# Patient Record
Sex: FEMALE | Race: WHITE
[De-identification: names, ages, dates, MRNs, and addresses within clinical notes are randomized per-mention and may not be internally consistent; named-entity substitution may affect disease eponyms.]

---

## 2018-04-02 ENCOUNTER — HOSPITAL ENCOUNTER (EMERGENCY)
Dept: HOSPITAL 1 - ED | Age: 53
Discharge: HOME | End: 2018-04-02
Payer: COMMERCIAL

## 2018-04-02 VITALS — SYSTOLIC BLOOD PRESSURE: 126 MMHG | DIASTOLIC BLOOD PRESSURE: 87 MMHG

## 2018-04-02 VITALS
WEIGHT: 145.99 LBS | HEIGHT: 65 IN | HEIGHT: 65 IN | WEIGHT: 145.99 LBS | BODY MASS INDEX: 24.32 KG/M2 | BODY MASS INDEX: 24.32 KG/M2

## 2018-04-02 DIAGNOSIS — Z00.00: Primary | ICD-10-CM

## 2019-02-06 ENCOUNTER — HOSPITAL ENCOUNTER (INPATIENT)
Dept: HOSPITAL 1 - ED | Age: 54
LOS: 2 days | Discharge: OTHER INSTITUTION | DRG: 202 | End: 2019-02-08
Attending: INTERNAL MEDICINE | Admitting: INTERNAL MEDICINE
Payer: COMMERCIAL

## 2019-02-06 VITALS
BODY MASS INDEX: 29.41 KG/M2 | HEIGHT: 65 IN | WEIGHT: 176.5 LBS | WEIGHT: 176.5 LBS | BODY MASS INDEX: 29.41 KG/M2 | HEIGHT: 65 IN

## 2019-02-06 DIAGNOSIS — R09.1: ICD-10-CM

## 2019-02-06 DIAGNOSIS — J44.1: ICD-10-CM

## 2019-02-06 DIAGNOSIS — E23.2: ICD-10-CM

## 2019-02-06 DIAGNOSIS — I49.3: ICD-10-CM

## 2019-02-06 DIAGNOSIS — Z87.891: ICD-10-CM

## 2019-02-06 DIAGNOSIS — Z79.52: ICD-10-CM

## 2019-02-06 DIAGNOSIS — I25.10: ICD-10-CM

## 2019-02-06 DIAGNOSIS — I11.9: ICD-10-CM

## 2019-02-06 DIAGNOSIS — J20.9: Primary | ICD-10-CM

## 2019-02-06 DIAGNOSIS — Z79.84: ICD-10-CM

## 2019-02-06 LAB
ALBUMIN SERPL-MCNC: 3.6 G/DL (ref 3.4–5)
ALP SERPL-CCNC: 79 U/L (ref 46–116)
ALT SERPL-CCNC: 21 U/L (ref 14–59)
AMPHETAMINES UR QL SCN: (no result)
AMYLASE SERPL-CCNC: 36 U/L (ref 25–115)
AST SERPL-CCNC: 11 U/L (ref 15–37)
BASOPHILS NFR BLD: 0.4 % (ref 0–2)
BILIRUB SERPL-MCNC: 0.1 MG/DL (ref 0.2–1)
BUN SERPL-MCNC: 13 MG/DL (ref 7–18)
CALCIUM SERPL-MCNC: 8 MG/DL (ref 8.5–10.1)
CHLORIDE SERPL-SCNC: 103 MMOL/L (ref 98–107)
CHOLEST SERPL-MCNC: 167 MG/DL (ref ?–200)
CHOLEST SERPL-MCNC: 168 MG/DL (ref ?–200)
CHOLEST/HDLC SERPL: 3.1 MG/DL
CO2 SERPL-SCNC: 24.9 MMOL/L (ref 21–32)
CREAT SERPL-MCNC: 0.8 MG/DL (ref 0.6–1)
ERYTHROCYTE [DISTWIDTH] IN BLOOD BY AUTOMATED COUNT: 13 % (ref 11.5–14.5)
GFR SERPLBLD BASED ON 1.73 SQ M-ARVRAT: > 60 ML/MIN
GLUCOSE SERPL-MCNC: 137 MG/DL (ref 74–106)
HDLC SERPL-MCNC: 53 MG/DL (ref 40–60)
HDLC SERPL-MCNC: 54 MG/DL (ref 40–60)
LIPASE SERPL-CCNC: 122 IU/L (ref 73–393)
MAGNESIUM SERPL-MCNC: 2.3 MG/DL (ref 1.8–2.4)
MICROSCOPIC UR-IMP: YES
PLATELET # BLD: 213 X10^3MCL (ref 130–400)
POTASSIUM SERPL-SCNC: 3.8 MMOL/L (ref 3.5–5.1)
PROT SERPL-MCNC: 6.8 G/DL (ref 6.4–8.2)
RBC # UR STRIP.AUTO: (no result) /UL
SODIUM SERPL-SCNC: 137 MMOL/L (ref 136–145)
TRIGL SERPL-MCNC: 66 MG/DL (ref ?–150)
UA SPECIFIC GRAVITY: 1.01 (ref 1–1.03)

## 2019-02-06 PROCEDURE — A9500 TC99M SESTAMIBI: HCPCS

## 2019-02-06 PROCEDURE — G0480 DRUG TEST DEF 1-7 CLASSES: HCPCS

## 2019-02-06 NOTE — NUR
PT. IN ED WITH C/O CHEST PRESSURE, UPPER BACK PAIN, AND LEFT JAW PAIN SINCE
12PM TODAY. STATES SHE WAS LAYING IN BED WHEN SHE STARTED HAVING CHEST PRESURE,
STATES FEEL "LIKE SOMETHING IS SITTING ON CHEST" PAIN RADIATES TO UPPER BACK
AREA. REPORTS SHE HAS BEEN FEELING SOB X5DAYS AND HAS BEEN TAKING PREDNISONE
AND USING ALBUTEROL INHALER. REPORTS SOME DIZZYNESS. PT. AAOX4, TALKING AND
RESPONDING APPRORPIATELY, BREATHING E/U. NAD. AWAITING MSE.

## 2019-02-06 NOTE — NUR
RECEIVED REPORT FROM FARRAH-LUCIAN, RESUMING THE CARE, PT AAO X 4 AND BREATHING EVEN
AND UNLABORED ON ROOM AIR WITH NO RESP DISTRESS NOTED, TWO GUARDS AT BEDSIDE.

## 2019-02-07 VITALS — DIASTOLIC BLOOD PRESSURE: 73 MMHG | SYSTOLIC BLOOD PRESSURE: 110 MMHG

## 2019-02-07 VITALS — DIASTOLIC BLOOD PRESSURE: 75 MMHG | SYSTOLIC BLOOD PRESSURE: 126 MMHG

## 2019-02-07 VITALS — SYSTOLIC BLOOD PRESSURE: 131 MMHG | DIASTOLIC BLOOD PRESSURE: 66 MMHG

## 2019-02-07 VITALS — SYSTOLIC BLOOD PRESSURE: 121 MMHG | DIASTOLIC BLOOD PRESSURE: 74 MMHG

## 2019-02-07 VITALS — DIASTOLIC BLOOD PRESSURE: 71 MMHG | SYSTOLIC BLOOD PRESSURE: 131 MMHG

## 2019-02-07 VITALS — DIASTOLIC BLOOD PRESSURE: 74 MMHG | SYSTOLIC BLOOD PRESSURE: 120 MMHG

## 2019-02-07 VITALS — SYSTOLIC BLOOD PRESSURE: 119 MMHG | DIASTOLIC BLOOD PRESSURE: 75 MMHG

## 2019-02-07 LAB
BASOPHILS NFR BLD: 0.1 % (ref 0–2)
BUN SERPL-MCNC: 11 MG/DL (ref 7–18)
CALCIUM SERPL-MCNC: 8.8 MG/DL (ref 8.5–10.1)
CHLORIDE SERPL-SCNC: 103 MMOL/L (ref 98–107)
CO2 SERPL-SCNC: 23.8 MMOL/L (ref 21–32)
CREAT SERPL-MCNC: 0.9 MG/DL (ref 0.6–1)
ERYTHROCYTE [DISTWIDTH] IN BLOOD BY AUTOMATED COUNT: 12.3 % (ref 11.5–14.5)
GFR SERPLBLD BASED ON 1.73 SQ M-ARVRAT: > 60 ML/MIN
GLUCOSE SERPL-MCNC: 108 MG/DL (ref 74–106)
PLATELET # BLD: 204 X10^3MCL (ref 130–400)
POTASSIUM SERPL-SCNC: 4.3 MMOL/L (ref 3.5–5.1)
SODIUM SERPL-SCNC: 139 MMOL/L (ref 136–145)

## 2019-02-07 NOTE — NUR
PT RECEIVED FROM ED, ACCOMPANIED BY NURSE AND X2 CORRECTIONAL OFFICERS,
TRANSPORTED BY [facility]. NO ACUTE DISTRESS NOTED. PT IS ALERT AND ORIENTED X4.
PT DENIES CHEST PAIN AT THIS TIME. TELE MONITOR IN PL, #10, READING NSR WITH
PVC'S, /75 HR 63, PT ON RA, SATTING 100%, BREATHING E/U, RR 18;
AFEBRILE. PT C/O OF 8/10 BACK PAIN, WILL MEDICATE PER EMAR. IV TO RFA SALINE
LOCKED, PATENT AND INTACT AT THIS TIME. PT NOTED TO HAVE SHACKLES TO BLE.
CIRCULATION, MOTOR AND SENSATION INTACT. ALL SAFETY MEASURES MAINTAINED. PT
ORIENTED TO ROOM AND ENVIRONMENT, CALL LIGHT IN REACH, X2 CIW OFFICERS AT
BEDSIDE. WILL CONTINUE TO MONITOR.

## 2019-02-07 NOTE — NUR
ASKED NORCO 5/325 FOR CHRONIC BACK PAIN EVERY 4 HRS. NO S/S OF CHEET PAIN.
TOLERATED CARDIAC DIET. BRP. VOID FREELY. BM X1 THIS SHIFT. ENDROSED CARE TO
NOC NURSE.

## 2019-02-07 NOTE — NUR
RECEIVED REPORT FROM DAY SHIFT RN. PT RESTING IN BED. AA&O X4. NO SOB ON ROOM
AIR. BREATHING EVEN AND UNLABORED. NO C/O CHEST PAIN AT THIS TIME. IV TO RFA,
INTACT. SAFETY MEASURES IN PLACE. BED IN LOWEST POSITION. SIDE RAILS UP X2.
INSTRUCTED PT TO USE THE CALL LIGHT FOR ASSISTANCE. CALL LIGHT WITHIN REACH.
MAC URENA AT BEDSIDE.

## 2019-02-07 NOTE — NUR
RECEIVED PATIENT A/A/OX4; TELE#10; SR; HR =77. BREATHING CLEAR ROMEL; O2 SAT 98%
ON RA. NO RESP DISTRESS. DENIED CHEST PAIN NOW. C/O BACK PAIN. GOOD APPETITE.
AMBULATORY. NO EDEMA NOTED. IVHL'D TO RFA. CALL LIGHT IN REACH. CIW GUARDS IN
ROOM.

## 2019-02-07 NOTE — NUR
PT RESTING AT THIS TIME WITH EYES CLOSED. NO INDICATION OF DISTRESS OR ACUTE
CHANGES IN CONDITION NOTED SINCE ADMISSION TO FLOOR. NO FURTHER C/O OF PAIN OR
DISCOMFORT. PT REMAINS ON TELE MONITOR. CONTINUING TO READ NSR. NO C/O CHEST
PAIN OR RESP. DISCOMFORT. BREATHING E/U, NO C/O SOB. IV TO RFA REMAINS SALINE
LOCKED AND INTACT. ALL SAFETY AND COMFORT MEASURES MAINTAINED. CALL LIGHT IN
REACH. X2 CIM OFFICERS REMAIN NEAR BEDSIDE. WILL ENDORSE CARE TO AM NURSE AND
CONTINUE TO MONITOR.

## 2019-02-07 NOTE — NUR
COVERING FOR RONI RN: PT C/O SHARP BACK PAIN 8/10; GIVEN NORCO 5/325MG PO 1
TAB. DIMMED ROOM FOR COMFORT. WILL CONTINUE TO MONITOR.

## 2019-02-07 NOTE — NUR
MONSE LEOS AND ME, ATTEMPTED 5 TIMES TO INSERT 20G NEEDLE FOR PULMONARY
ANGIO W/ IV CONTRAST. BUT NOT SUCCESSFUL. DR. ARREDONDO NOTIFIED. TELEPHONE ORDER
TO CHANGED PULMONARY ANGIO WITH CONTRAST.

## 2019-02-07 NOTE — NUR
SPOKE TO DR. PALOMINO TO CLARIFY ALBUTEROL ORDER PER PHARMACY REQUEST. PER DR. PALOMINO
TO CANCEL ORDER.

## 2019-02-08 VITALS — DIASTOLIC BLOOD PRESSURE: 70 MMHG | SYSTOLIC BLOOD PRESSURE: 128 MMHG

## 2019-02-08 VITALS — SYSTOLIC BLOOD PRESSURE: 130 MMHG | DIASTOLIC BLOOD PRESSURE: 75 MMHG

## 2019-02-08 VITALS — DIASTOLIC BLOOD PRESSURE: 75 MMHG | SYSTOLIC BLOOD PRESSURE: 130 MMHG

## 2019-02-08 VITALS — SYSTOLIC BLOOD PRESSURE: 109 MMHG | DIASTOLIC BLOOD PRESSURE: 85 MMHG

## 2019-02-08 NOTE — NUR
PATIENT IN BED, EATING DINNER TRAY.  NO SIGNS OF PAIN. DISCHARGE INSTRUCTIONS
EXPLAINED TO PATIENT, PROCEDURE RESULTS EXPLAINED. PATIENT VERBALIZES
UNDERSTANDING.  DISCHARGE PACKAGE GIVEN TO  OFFICERS. [facility] STATES
TRANSPORT WILL BE AFTER 1930. PATIENT BELONGINGS PRESENT.

## 2019-02-08 NOTE — NUR
PATIENT IN BED, HAS MILD PAIN IN HER LOWER BACK. IVP SOLUMEDROL ADMINISTERED,
PATIENT TOLERATED. PATIENT STILL AWAITING TRANSPORT BACK TO [facility]. WILL ENDORSE
TO ONCOMING NURSE.

## 2019-02-08 NOTE — NUR
SPOKE WITH DR. DUKE AND WAS AWARE L[place]ISCAN RESULTED NO EVIDENCE OF MYOCARDIAL
ISCHEMIA OR INFARCTION. EF 89% PER DR. DUKE PATIENT MAY DISCHARGE.

## 2019-02-08 NOTE — NUR
PT BEING D/C BACK TO THE [facility],WAS PICKED UP BY THE GUARDS IN STABLE CONDITION
PROIR TO D/C.

## 2019-02-08 NOTE — NUR
PATIENT IN BED, STATES THAT HER MIGRAINE IS NO LONGER BOTHERING HER.  IVP
MEDICATIONS GIVEN IN RAC IV, TOLERATED, NO COMPLICATIONS.  FLUSHED EASILY.
X-RAY TECH ENTERED ROOM, TRANSPORTED PATIENT TO RADIOLOGY VIA WHEELCHAIR.

## 2019-02-08 NOTE — NUR
PATIENT RESTING IN BED NO ACUTE DISTRESS NOTED, ON BREATHING TX WITH RT AT
BEDSIDE. C/O BACK PAIN 9/10 HARD TO TAKE DEEP BREATHE. NORCO 1 TAB PO GIVEN
FOR PAIN. GUARDS REMAIN AT BEDSIDE. CONT TO MONITOR.

## 2019-02-08 NOTE — NUR
PATIENT CALLED NURSES STATION FOR MIGRAINE MEDICATION.  REASSESSMENT OF PRN
PAIN MEDICATION: "IT FEELS ABOUT THE SAME" WITH 7/10 PAIN.  PRN IMATREX
ADMINISTERED PO. WILL FOLLOW UP WITH MIGRAINE HA. CALL LIGHT IN REACH, BED IN
LOWEST POSITION.

## 2019-02-08 NOTE — NUR
PT SLEPT AT LONG INTERVALS DURING SHIFT. NO SOB ON ROOM AIR. NO ACUTE CHANGES
NOTED. SAFETY MEASURES MAINTAINED. CALL LIGHT WITHIN REACH. WILL ENDORSE CARE
TO DAY SHIFT RN.

## 2019-02-08 NOTE — NUR
DR. CANDELARIO GARDNER WAS MADE AWARE OF L[place]ISCAN RESULTED NO EVIDENCE OF ISCHEMIA OR
INFARCTION. PER DR. PALOMINO PATIENT CAN BE DISCHRGE BACK TO [facility].

## 2019-02-08 NOTE — NUR
PATIENT STATES THAT SHE IS HAVING LOWERBACK PAIN.  RESTING IN BED WITH 
PRESENT.  REPOSITIONING AND RELAXATION ENCOURAGED. PRN MEDICATION OFFERED AND
ADMINISTERED. WILL FU WITH REASSESSMENT

## 2019-02-08 NOTE — NUR
PT SLEEPING. NO SOB ON ROOM AIR. NO DISTRESS NOTED. SAFETY MEASURES IN
PLACE. CALL LIGHT WITHIN REACH. CIM GUARDS AT BEDSIDE.